# Patient Record
Sex: MALE
[De-identification: names, ages, dates, MRNs, and addresses within clinical notes are randomized per-mention and may not be internally consistent; named-entity substitution may affect disease eponyms.]

---

## 2024-03-19 PROBLEM — Z00.00 ENCOUNTER FOR PREVENTIVE HEALTH EXAMINATION: Status: ACTIVE | Noted: 2024-03-19

## 2024-04-09 ENCOUNTER — APPOINTMENT (OUTPATIENT)
Dept: SURGERY | Facility: CLINIC | Age: 34
End: 2024-04-09
Payer: COMMERCIAL

## 2024-04-09 VITALS
WEIGHT: 229 LBS | SYSTOLIC BLOOD PRESSURE: 123 MMHG | DIASTOLIC BLOOD PRESSURE: 73 MMHG | HEART RATE: 98 BPM | OXYGEN SATURATION: 98 % | HEIGHT: 75 IN | BODY MASS INDEX: 28.47 KG/M2

## 2024-04-09 DIAGNOSIS — Z82.3 FAMILY HISTORY OF STROKE: ICD-10-CM

## 2024-04-09 DIAGNOSIS — F12.90 CANNABIS USE, UNSPECIFIED, UNCOMPLICATED: ICD-10-CM

## 2024-04-09 DIAGNOSIS — Z80.0 FAMILY HISTORY OF MALIGNANT NEOPLASM OF DIGESTIVE ORGANS: ICD-10-CM

## 2024-04-09 DIAGNOSIS — Z82.49 FAMILY HISTORY OF ISCHEMIC HEART DISEASE AND OTHER DISEASES OF THE CIRCULATORY SYSTEM: ICD-10-CM

## 2024-04-09 DIAGNOSIS — Z80.7 FAMILY HISTORY OF OTHER MALIGNANT NEOPLASMS OF LYMPHOID, HEMATOPOIETIC AND RELATED TISSUES: ICD-10-CM

## 2024-04-09 DIAGNOSIS — R79.89 OTHER SPECIFIED ABNORMAL FINDINGS OF BLOOD CHEMISTRY: ICD-10-CM

## 2024-04-09 DIAGNOSIS — Z80.8 FAMILY HISTORY OF MALIGNANT NEOPLASM OF OTHER ORGANS OR SYSTEMS: ICD-10-CM

## 2024-04-09 DIAGNOSIS — K80.10 CALCULUS OF GALLBLADDER WITH CHRONIC CHOLECYSTITIS W/OUT OBSTRUCTION: ICD-10-CM

## 2024-04-09 DIAGNOSIS — R10.13 EPIGASTRIC PAIN: ICD-10-CM

## 2024-04-09 PROCEDURE — 99204 OFFICE O/P NEW MOD 45 MIN: CPT

## 2024-04-09 PROCEDURE — G2211 COMPLEX E/M VISIT ADD ON: CPT

## 2024-04-09 RX ORDER — CHORIOGONADOTROPIN ALFA 10000 UNIT
KIT INTRAMUSCULAR
Refills: 0 | Status: ACTIVE | COMMUNITY

## 2024-04-09 RX ORDER — TESTOSTERONE ENANTHATE 200 MG/ML
200 INJECTION, SOLUTION INTRAMUSCULAR
Refills: 0 | Status: ACTIVE | COMMUNITY

## 2024-04-09 RX ORDER — ANASTROZOLE TABLETS 1 MG/1
TABLET ORAL
Refills: 0 | Status: ACTIVE | COMMUNITY

## 2024-04-09 RX ORDER — CHLORHEXIDINE GLUCONATE 4 %
LIQUID (ML) TOPICAL
Refills: 0 | Status: ACTIVE | COMMUNITY

## 2024-04-09 RX ORDER — EMTRICITABINE AND TENOFOVIR ALAFENAMIDE 120; 15 MG/1; MG/1
TABLET ORAL
Refills: 0 | Status: ACTIVE | COMMUNITY

## 2024-04-09 RX ORDER — TADALAFIL 20 MG/1
20 TABLET, FILM COATED ORAL
Refills: 0 | Status: ACTIVE | COMMUNITY

## 2024-04-09 NOTE — DATA REVIEWED
[FreeTextEntry1] : Labs (3/6/2024) - WBC 7.26, 71.1% neutrophils, , , alk phos 63, total bilirubin 0.4, amylase 45, lipase 33, CRP 8.2.  US abdomen (3/11/2024) - new gallstone measuring 1.2 cm.  Mildly thickened gallbladder wall.  Mildly enlarged liver.  No steatosis demonstrated.  Labs (3/11/2024) - WBC 6.49, 52.5% neutrophils, AST 45, ALT 88, alk phos 70, total bilirubin 0.3, amylase 54, lipase 45, CRP < 3.4.  CT abdomen/pelvis (3/13/2024) - no CT evidence of acute cholecystitis.  The gallstone demonstrated on ultrasound is not demonstrated on CT.  This could indicate that the finding interpreted as a stone could have been a non-calcified stone, adjacent bowel compressing the gallbladder wall, tumefactive sludge, or possibly a polyp.  Unlikely to be a polyp given its shadowing.  Further clarification could be obtained with abdominal MRI with and without gadolinium.  If MRI is not obtained, recommend a follow up gallbladder ultrasound in 6 months.  There is no gallbladder wall thickening.  No biliary duct dilatation.  Mildly enlarged liver with fatty infiltration.

## 2024-04-09 NOTE — ASSESSMENT
[FreeTextEntry1] : Mr. Jenkins is a 33-year-old man with cholelithiasis, epigastric pain, and elevated LFTs.  We discussed the risks, benefits, and alternatives to surgery, including but not limited to bleeding, infection, and damage to the surrounding structures, including the common bile duct and intestine.  The patient will decide if he wishes to proceed with surgery, and if he wishes to proceed, we will plan for a robotic-assisted cholecystectomy at the patient's convenience.

## 2024-04-09 NOTE — PHYSICAL EXAM
[Calm] : calm [de-identified] : NAD, comfortable  [de-identified] : NCAT, no scleral icterus  [de-identified] : +BS soft NT ND.  No hepatosplenomegaly.  [de-identified] : No clubbing, cyanosis, or edema.  [de-identified] : Warm, dry.  [de-identified] : A&Ox3

## 2024-04-09 NOTE — CONSULT LETTER
[FreeTextEntry1] : 2024    Curt Mc M.D. [facility] [address] [place] [address] Telephone #: [phone]   Re: Isidro Jenkins : 1990   Dear Dr. Mc:  I had the opportunity to see Mr. Jenkins today for evaluation and management of epigastric pain.  He stated he had an episode of pain in the beginning of March just before he was about to leave for vacation in [place].  He stated he felt like his upper abdomen was "being squeezed", but thought it was severe gas pain, although his "stomach felt hard".  He denied nausea, but made himself vomit without relief of the pain with vomiting.  He rated the pain a 9-10/10.  He presented to urgent care, where he was prescribed Pepcid and referred to gastroenterology.  He then underwent a CT abdomen/pelvis, an ultrasound, and an endoscopy, and was started on omeprazole.  He noted the pain completely resolved within 2 days of starting omeprazole.  He stated he completed the prescribed course of omeprazole approximately 1 week ago, and has not taken any Pepcid in approximately 2 weeks, and has not had any further episodes of pain.  On physical examination, his height is 6 feet 3 inches, his weight is 229 pounds, and BMI is 28.62. His blood pressure is 123/73, heart rate is 98, and O2 saturation is 98% on room air. In general, he is a well-dressed well-nourished man who appears his stated age and is in no acute distress. He is calm, alert and oriented x3. HEENT exam demonstrates a normocephalic atraumatic appearance with no scleral icterus. His abdomen has audible bowel sounds, is soft, non-tender, and non-distended.  His extremities are warm and dry without clubbing, cyanosis or edema.    I reviewed the report of the blood work that was performed on 2024, which demonstrated a normal WBC of 7.26 with 71.1% neutrophils, an elevated AST of 120, an elevated ALT of 161, a normal alkaline phosphatase of 63, a normal total bilirubin of 0.4, a normal amylase of 45, a normal lipase of 33, and an elevated CRP of 8.2.  I reviewed the report of the ultrasound of the abdomen that was performed on 2024, which demonstrated a new gallstone measuring 1.2 cm.  Mildly thickened gallbladder wall.  Mildly enlarged liver.  No steatosis demonstrated.  I reviewed the report of the blood work that was performed on 2024, which demonstrated a normal WBC of 6.49 with 52.5% neutrophils, a slightly elevated AST of 45, an elevated ALT of 88, a normal alkaline phosphatase of 70, a normal total bilirubin of 0.3, a normal amylase of 54, a normal lipase of 45, and a normal CRP < 3.4.  I reviewed the report and images of the CT abdomen/pelvis that was performed on 2024, which demonstrated no CT evidence of acute cholecystitis.  The gallstone demonstrated on ultrasound is not demonstrated on CT.  This could indicate that the finding interpreted as a stone could have been a non-calcified stone, adjacent bowel compressing the gallbladder wall, tumefactive sludge, or possibly a polyp.  Unlikely to be a polyp given its shadowing.  Further clarification could be obtained with abdominal MRI with and without gadolinium.  If MRI is not obtained, recommend a follow up gallbladder ultrasound in 6 months.  There is no gallbladder wall thickening.  No biliary duct dilatation.  Mildly enlarged liver with fatty infiltration.  In summary, Mr. Jenkins is a 33-year-old man with cholelithiasis, epigastric pain, and elevated LFTs.  We discussed the risks, benefits, and alternatives to surgery, including but not limited to bleeding, infection, and damage to the surrounding structures, including the common bile duct and intestine.  The patient will decide if he wishes to proceed with surgery, and if he wishes to proceed, we will plan for a robotic-assisted cholecystectomy at the patient's convenience.  Thank you for the opportunity to care for this patient. Please do not hesitate to contact me in the event that you have any questions or concerns about the care of this patient.  Sincerely,    Khalida Cobos M.D.  CC: Alo Wooten M.D. [facility] [address] [place] [address], [place] [address] [place] [address] Telephone #: [phone]

## 2024-04-09 NOTE — HISTORY OF PRESENT ILLNESS
[de-identified] : Mr. Jenkins presented today for evaluation and management of epigastric pain.  He stated he had an episode of pain in the beginning of March just before he was about to leave for vacation in [place].  He stated he felt like his upper abdomen was "being squeezed", but thought it was severe gas pain, although his "stomach felt hard".  He denied nausea, but made himself vomit without relief of the pain with vomiting.  He rated the pain a 9-10/10.  He presented to urgent care, where he was prescribed Pepcid and referred to gastroenterology.  He then underwent a CT abdomen/pelvis, an ultrasound, and an endoscopy, and was started on omeprazole.  He noted the pain completely resolved within 2 days of starting omeprazole.  He stated he completed the prescribed course of omeprazole approximately 1 week ago, and has not taken any Pepcid in approximately 2 weeks, and has not had any further episodes of pain.